# Patient Record
(demographics unavailable — no encounter records)

---

## 2019-04-25 NOTE — RAD
Exam:

XR Ankle Lt 3 View STANDARD



HISTORY:

Swelling to left ankle. Unknown etiology. No history of trauma.



COMPARISON:

None



FINDINGS:

There is diffuse subcutaneous soft tissue swelling about the ankle.

No acute fracture, dislocation, or other acute osseous abnormality is identified.

There is a corticated osseous density inferior to the lateral malleolus which may represent an access
ory center of ossification versus a remote avulsion injury.



IMPRESSION:

Diffuse subcutaneous soft tissue swelling about the ankle without evidence of an acute fracture.



Reported By: Tim Montoya 

Electronically Signed:  4/25/2019 5:10 PM

## 2019-04-27 NOTE — ULT
EXAM:

Left lower extremity venous Doppler



HISTORY:

left lower extremity edema and pain



FINDINGS:

Grayscale, color-flow, Doppler evaluation, spectral analysis of the left lower extremity venous struc
tures is performed with 2-D imaging. The left common femoral, superficial femoral, popliteal,

posterior tibial, proximal greater saphenous and profunda femoral veins are imaged.



There is normal luminal compressibility, flow, and augmentation the visualized deep venous structures
 of the left lower extremity.



IMPRESSION:

No evidence of a deep vein thrombosis in the visualized deep venous structures left lower extremity.



Reported By: Saqib Farris 

Electronically Signed:  4/27/2019 7:42 PM

## 2019-12-17 NOTE — RAD
Chest one view



HISTORY: Cough.



FINDINGS: No comparison. Cardiac silhouette is magnified by projection. Pulmonary vasculature is uppe
r limits of normal. Patient is slightly rotated leftward. Calcification in the aorta.



Dense infiltrate within the anterior segment. Masslike infiltrate within the posterior lung base righ
t lower lobe.



Reticulonodular interstitial prominence throughout the left lung.



No evidence of pneumothorax.







IMPRESSION: Multifocal masslike infiltrate. Consider multifocal pneumonitis. Please consider close co
ntinued radiographic follow-up.



Borderline pulmonary vascular prominence.



Atherosclerosis.



Reported By: GIANNA Card 

Electronically Signed:  12/17/2019 3:11 PM

## 2019-12-17 NOTE — CT
CT chest noncontrast



HISTORY: Cough. Dyspnea. Infiltrate.



FINDINGS: Dense consolidation of the right upper lobe is actually within the posterior segment. Multi
focal infiltrates also present throughout each lower lobe. No significant pleural fluid.



No pneumothorax. Lack of contrast limits evaluation of the soft tissues. No bulky mediastinal adenopa
thy. Bilateral breast implants partially visualized.











IMPRESSION: Multifocal pneumonia.



Reported By: GIANNA Card 

Electronically Signed:  12/17/2019 3:58 PM